# Patient Record
Sex: FEMALE | URBAN - METROPOLITAN AREA
[De-identification: names, ages, dates, MRNs, and addresses within clinical notes are randomized per-mention and may not be internally consistent; named-entity substitution may affect disease eponyms.]

---

## 2022-06-02 ENCOUNTER — TELEPHONE (OUTPATIENT)
Dept: PHARMACY | Facility: CLINIC | Age: 72
End: 2022-06-02

## 2022-06-02 RX ORDER — ATORVASTATIN CALCIUM 10 MG/1
10 TABLET, FILM COATED ORAL DAILY
COMMUNITY

## 2022-06-02 NOTE — LETTER
REFILL REQUEST  Prescriber:  Norman Ochoa DO    [facility], [address] [place] [address]   [phone] [facility] [phone] (Fax)     Patient:  Jaja Degree 1950  [address]  [phone] (home)        Patient identified as being non-adherent to atorvastatin therapy. Patient requests 90-day supply to reduce trips to the pharmacy and improve medication adherence. Please approve/deny below request and send to the pharmacy. Medication: atorvastatin 10 mg             Sig: Take 1 tab po daily           #: 90       R: 1         Prescriber Response:    Prescription(s) approved (please [place]):  YES  NO    Other response: ________________________________________      ______________________________________   __________________  Authorized By       Date     Pharmacy:   [facility] [id number]  [address] [phone] - F [phone]     The information transmitted is intended only for the person or entity to which it is addressed and may contain confidential and/or privileged material. Any review, retransmission, dissemination or other use of, or taking of any action in reliance upon, this information by persons or entities other than the intended recipient is prohibited. This document contains information covered under the Privacy Act, 5 (a), and/or the [employer] and Accountability Act ([address]) and its various implementing regulations and must be protected in accordance with those provisions. If you received this in error, please contact the sender and delete the material from any computer.

## 2022-06-02 NOTE — TELEPHONE ENCOUNTER
[facility] CLINICAL PHARMACY: ADHERENCE REVIEW  Identified care gap per [place]: fills at [address]: Statin adherence    Last Visit: 3/25/22 Jose D Mendoza PCP)    Patient not found in [facility]    [address] [facility] Records claims through 22 (YTD [place] Newberry = 53%; Potential Fail Date: 22): Atorvastatin due to refill on 22. Last filled 30 day supply. PLAN  The following are interventions that have been identified:   - Patient overdue refilling atorvastatin and active on home medication list.   - Patient eligible for 90 day supply of atorvastatin    Reached patient to review. Patient reports taking atorvastatin 10 mg daily and states that she just picked this up from [address] recently. Was not near her medication bottles to verify. Patient is agreeable to 90-day supply moving forward. Called [facility] pharmacy: atorvastatin last filled in April. 4 refills remaining. Pharmacy staff will process a 90-day supply prescription for her today. Called patient back and informed her that atorvastatin was last filled in April and that [facility] is processing a 90-day supply for her today. Patient states understanding and she will pick it up. Will process a 90-day supply refill request and send to Dr. Mignon Orourke today. No future appointments.     Villa Cardona, PharmD, [place] // Department, toll free [phone], option [address] [facility] in place:  No   Recommendation Provided To: Provider: 1 via Fax sent to office, Patient/Caregiver: 1 via Telephone and Pharmacy: 1   Intervention Detail: Adherence Monitorin and New Rx: 1, reason: Improve Adherence   Gap Closed?: Yes    Intervention Accepted By: Provider: 0, Patient/Caregiver: 1 and Pharmacy: 1   Time Spent (min): 20

## 2022-08-23 ENCOUNTER — TELEPHONE (OUTPATIENT)
Dept: PHARMACY | Facility: CLINIC | Age: 72
End: 2022-08-23

## 2022-08-23 NOTE — TELEPHONE ENCOUNTER
[facility] CLINICAL PHARMACY: ADHERENCE REVIEW  Identified care gap per [place]: fills at [address]: Statin adherence    Last Visit: unknown outside provider    [address] Records claims through 8.8.22  YTD [place] =  67%; Potential Fail Date: 9/5/22 ):   Atorvastatin 10mg Next refill due: 8/31/22    Per [employer]:   Staff will process refill today    Per portal:  LF 6/2/22 90DS    No results found for: CHOL, TRIG, HDL, LDLCHOLESTEROL, LDLCALC, LDLDIRECT  No results found for: ALT, AST  The ASCVD Risk score (Aleta Kruse, et al., 2013) failed to calculate for the following reasons: The systolic blood pressure is missing    Cannot find a previous HDL lab    Cannot find a previous total cholesterol lab    The smoking status is invalid    Unable to determine if patient is Non-      PLAN  The following are interventions that have been identified:   Fill date of 8/31 and fail date 9/5    Had Feng process refill      No future appointments. Celsa Martinez CPhT.    [facility] free: [address] [facility] in place:  No  Recommendation Provided To: Pharmacy: 1  Intervention Detail: Refill(s) Provided  Gap Closed?: Yes   Intervention Accepted By: Pharmacy: 1  Time Spent (min): 15

## 2022-11-25 ENCOUNTER — TELEPHONE (OUTPATIENT)
Dept: PHARMACY | Facility: CLINIC | Age: 72
End: 2022-11-25

## 2022-11-25 NOTE — TELEPHONE ENCOUNTER
[facility] CLINICAL PHARMACY: ADHERENCE REVIEW  Identified care gap per [place]: fills at [address]: Statin adherence    Last Visit: [address] Records claims through 11/21/22 ; YTD [place] =  79%; Potential Fail Date: 12/2/22 ):   ATORVASTATIN 10 MG TABLET due to refill 11/27/22 for 90 day supply. Per [employer]:   ATORVASTATIN 10 MG TABLET last picked up on 9/7/22 for 90 day supply. 1 refills remaining. Billed through [facility]     No results found for: CHOL, TRIG, HDL, LDLCHOLESTEROL, LDLCALC, LDLDIRECT  No results found for: ALT, AST  The ASCVD Risk score (Brenton DK, et al., 2019) failed to calculate for the following reasons: The systolic blood pressure is missing    Cannot find a previous HDL lab    Cannot find a previous total cholesterol lab    The smoking status is invalid    Unable to determine if patient is Non-      PLAN    Atorvastatin due for refill on 12/7/22. Will call pharmacy back on 11/30/22 to have them fill before fail date. No future appointments.     Domenico Cox[address]   Direct: [phone]  Phone: toll free [phone]

## 2022-11-30 NOTE — TELEPHONE ENCOUNTER
Called pharmacy and had them fill Atorvastatin for 90 d/s. Billed through [address] Called patient and left VM. Just need to remind that Atorvastatin is ready for  anytime after 2:00pm today at pharmacy. Will not send letter since patient appears to be adherent at this time.        Danial Nicholson, [address]   Phone: [phone]       For Pharmacy [address] in place:  No  Recommendation Provided To: Pharmacy: 1  Intervention Detail: Adherence Monitorin  Gap Closed?: Yes   Intervention Accepted By: Pharmacy: 1  Time Spent (min): 15